# Patient Record
Sex: FEMALE | Race: WHITE | NOT HISPANIC OR LATINO | Employment: STUDENT | URBAN - METROPOLITAN AREA
[De-identification: names, ages, dates, MRNs, and addresses within clinical notes are randomized per-mention and may not be internally consistent; named-entity substitution may affect disease eponyms.]

---
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
## 2025-03-03 ENCOUNTER — OFFICE VISIT (OUTPATIENT)
Dept: URGENT CARE | Facility: CLINIC | Age: 20
End: 2025-03-03
Payer: COMMERCIAL

## 2025-03-03 VITALS
DIASTOLIC BLOOD PRESSURE: 77 MMHG | SYSTOLIC BLOOD PRESSURE: 112 MMHG | WEIGHT: 115 LBS | TEMPERATURE: 99 F | OXYGEN SATURATION: 98 % | HEART RATE: 87 BPM | BODY MASS INDEX: 18.05 KG/M2 | RESPIRATION RATE: 16 BRPM | HEIGHT: 67 IN

## 2025-03-03 DIAGNOSIS — J02.9 SORE THROAT: ICD-10-CM

## 2025-03-03 DIAGNOSIS — J02.9 ACUTE PHARYNGITIS, UNSPECIFIED ETIOLOGY: Primary | ICD-10-CM

## 2025-03-03 DIAGNOSIS — R09.82 POST-NASAL DRIP: ICD-10-CM

## 2025-03-03 LAB
CTP QC/QA: YES
MOLECULAR STREP A: NEGATIVE

## 2025-03-03 PROCEDURE — 99213 OFFICE O/P EST LOW 20 MIN: CPT | Mod: S$GLB,,, | Performed by: NURSE PRACTITIONER

## 2025-03-03 PROCEDURE — 87651 STREP A DNA AMP PROBE: CPT | Mod: QW,S$GLB,, | Performed by: NURSE PRACTITIONER

## 2025-03-03 NOTE — PATIENT INSTRUCTIONS
Tylenol or ibuprofen for pain may help as long as you are not allergic to these meds or have a medical condition such as stomach ulcers, liver or kidney disease or taking blood thinners etc that would prevent you from using these medications.     Rest and fluids will help as well.   Sore throats are commonly caused by viruses and does not require antibiotics.  Warm saltwater gargles, warm tea with honey, Chloraseptic spray, Cepacol throat lozenges as needed for sore throat.    If symptoms do not improve in 2-3 days please return for evaluation.

## 2025-03-03 NOTE — LETTER
March 3, 2025      [facility] [place]  [address] [address]  Phone: [phone]  Fax: [phone]       Patient: Anita Holly   YOB: 2005  Date of Visit: 03/03/2025    To Whom It May Concern:    Jenny Holly  was at [facility] on 03/03/2025. The patient may return to work/school on 3/4/2025 with no restrictions. If you have any questions or concerns, or if I can be of further assistance, please do not hesitate to contact me.    Sincerely,      Barbra Waetrs NP

## 2025-03-03 NOTE — PROGRESS NOTES
"Subjective:      Patient ID: Anita Holly is a 20 y.o. female.    Vitals:  height is 5' 7" (1.702 m) and weight is 52.2 kg (115 lb). Her oral temperature is 98.5 °F (36.9 °C). Her blood pressure is 112/77 and her pulse is 87. Her respiration is 16 and oxygen saturation is 98%.     Chief Complaint: Sore Throat    This pt complains of sore throat x 1 day. S/S: No nausea, vomiting, diarrhea, or fever. Had strep throat often when she was younger so wanted make sure it isn't strep.    Sore Throat   This is a new problem. The current episode started today. The problem has been unchanged. Sore throat worse side: both sides. The pain is at a severity of 5/10. The pain is mild. Pertinent negatives include no congestion, coughing, diarrhea, headaches or vomiting.       Constitution: Negative for fatigue and fever.   HENT:  Positive for sore throat. Negative for congestion.    Respiratory:  Negative for cough.    Gastrointestinal:  Negative for vomiting and diarrhea.   Neurological:  Negative for headaches.      Objective:     Physical Exam   Constitutional: She is oriented to person, place, and time. She appears well-developed. She is cooperative.  Non-toxic appearance. She does not appear ill. No distress.   HENT:   Head: Normocephalic and atraumatic.   Ears:   Right Ear: Hearing, tympanic membrane, external ear and ear canal normal.   Left Ear: Hearing, tympanic membrane, external ear and ear canal normal.   Nose: Nose normal. No mucosal edema, rhinorrhea, nasal deformity or congestion. No epistaxis. Right sinus exhibits no maxillary sinus tenderness and no frontal sinus tenderness. Left sinus exhibits no maxillary sinus tenderness and no frontal sinus tenderness.   Mouth/Throat: Uvula is midline and mucous membranes are normal. No trismus in the jaw. Normal dentition. No uvula swelling. Posterior oropharyngeal erythema (mild with some post nasal drip) present. No oropharyngeal exudate, posterior oropharyngeal edema, " tonsillar abscesses or cobblestoning.   Eyes: Conjunctivae and lids are normal. No scleral icterus.   Neck: Trachea normal and phonation normal. Neck supple. No edema present. No erythema present. No neck rigidity present.   Cardiovascular: Normal rate, regular rhythm, normal heart sounds and normal pulses.   Pulmonary/Chest: Effort normal and breath sounds normal. No stridor. No respiratory distress. She has no decreased breath sounds. She has no wheezes. She has no rales. She exhibits no tenderness.   Abdominal: Normal appearance.   Musculoskeletal: Normal range of motion.         General: No deformity. Normal range of motion.   Neurological: She is alert and oriented to person, place, and time. She exhibits normal muscle tone. Coordination normal.   Skin: Skin is warm, dry, intact, not diaphoretic and not pale.   Psychiatric: Her speech is normal and behavior is normal. Judgment and thought content normal.   Nursing note and vitals reviewed.      Assessment:     1. Acute pharyngitis, unspecified etiology    2. Sore throat    3. Post-nasal drip      Results for orders placed or performed in visit on 03/03/25   POCT Strep A, Molecular    Collection Time: 03/03/25 10:45 AM   Result Value Ref Range    Molecular Strep A, POC Negative Negative     Acceptable Yes        Plan:       Acute pharyngitis, unspecified etiology    Sore throat  -     POCT Strep A, Molecular    Post-nasal drip             Patient Instructions   Tylenol or ibuprofen for pain may help as long as you are not allergic to these meds or have a medical condition such as stomach ulcers, liver or kidney disease or taking blood thinners etc that would prevent you from using these medications.     Rest and fluids will help as well.   Sore throats are commonly caused by viruses and does not require antibiotics.  Warm saltwater gargles, warm tea with honey, Chloraseptic spray, Cepacol throat lozenges as needed for sore throat.    If symptoms  do not improve in 2-3 days please return for evaluation.

## 2025-05-29 ENCOUNTER — OFFICE VISIT (OUTPATIENT)
Dept: URGENT CARE | Facility: CLINIC | Age: 20
End: 2025-05-29
Payer: COMMERCIAL

## 2025-05-29 VITALS
SYSTOLIC BLOOD PRESSURE: 103 MMHG | TEMPERATURE: 99 F | BODY MASS INDEX: 18.83 KG/M2 | WEIGHT: 120 LBS | HEART RATE: 90 BPM | DIASTOLIC BLOOD PRESSURE: 64 MMHG | HEIGHT: 67 IN | OXYGEN SATURATION: 98 % | RESPIRATION RATE: 20 BRPM

## 2025-05-29 DIAGNOSIS — R35.0 URINARY FREQUENCY: Primary | ICD-10-CM

## 2025-05-29 LAB
BILIRUBIN, UA POC OHS: NEGATIVE
BLOOD, UA POC OHS: NEGATIVE
CLARITY, UA POC OHS: CLEAR
COLOR, UA POC OHS: YELLOW
GLUCOSE, UA POC OHS: NEGATIVE
KETONES, UA POC OHS: NEGATIVE
LEUKOCYTES, UA POC OHS: NEGATIVE
NITRITE, UA POC OHS: NEGATIVE
PH, UA POC OHS: 7
PROTEIN, UA POC OHS: NEGATIVE
SPECIFIC GRAVITY, UA POC OHS: 1.01
UROBILINOGEN, UA POC OHS: 0.2

## 2025-05-29 PROCEDURE — 81003 URINALYSIS AUTO W/O SCOPE: CPT | Mod: QW,S$GLB,, | Performed by: NURSE PRACTITIONER

## 2025-05-29 PROCEDURE — 99214 OFFICE O/P EST MOD 30 MIN: CPT | Mod: S$GLB,,, | Performed by: NURSE PRACTITIONER

## 2025-05-29 RX ORDER — NITROFURANTOIN 25; 75 MG/1; MG/1
100 CAPSULE ORAL 2 TIMES DAILY
Qty: 10 CAPSULE | Refills: 0 | Status: SHIPPED | OUTPATIENT
Start: 2025-05-29 | End: 2025-06-03

## 2025-05-29 NOTE — PROGRESS NOTES
"Subjective:      Patient ID: Anita Holly is a 20 y.o. female.    Vitals:  height is 5' 7" (1.702 m) and weight is 54.4 kg (120 lb). Her temperature is 98.5 °F (36.9 °C). Her blood pressure is 103/64 and her pulse is 90. Her respiration is 20 and oxygen saturation is 98%.     Chief Complaint: Dysuria    20 yr old female presents to the Urgent Care with complaint of urinary frequency and odor with urination x 2 weeks. Patient's home urinalysis test resulted positive for leukocytes. Patient denies any CP, SOB, abdominal pain, flank pain or fever.     Dysuria   This is a new problem. The current episode started 1 to 4 weeks ago. The problem occurs every urination. The problem has been gradually worsening. The pain is at a severity of 0/10. The patient is experiencing no pain. There has been no fever. She is Not sexually active. There is No history of pyelonephritis. Associated symptoms include frequency and urgency. Pertinent negatives include no behavior changes, chills, discharge, flank pain, hematuria, hesitancy, nausea, possible pregnancy, sweats, vomiting, weight loss, bubble bath use, constipation, rash or withholding. She has tried nothing for the symptoms. There is no history of recurrent UTIs or STD.       Constitution: Negative for chills.   Gastrointestinal:  Negative for nausea, vomiting and constipation.   Genitourinary:  Positive for frequency and urgency. Negative for dysuria, urine decreased, flank pain, bladder incontinence, bed wetting and hematuria.   Skin:  Negative for rash.      Objective:     Physical Exam   Constitutional: She is oriented to person, place, and time. She appears well-developed. She is cooperative. She is easily aroused.  Non-toxic appearance. She does not appear ill. No distress.   HENT:   Head: Normocephalic and atraumatic.   Nose: Nose abnormal.   Mouth/Throat: Mucous membranes are normal.   Eyes: Conjunctivae and lids are normal.   Cardiovascular: Normal rate, regular " rhythm and normal heart sounds.   Pulmonary/Chest: Effort normal and breath sounds normal. No respiratory distress.   Abdominal: Normal appearance and bowel sounds are normal. She exhibits no distension, no abdominal bruit, no pulsatile midline mass and no mass. Soft. There is no abdominal tenderness. There is no rigidity, no rebound, no guarding, no tenderness at McBurney's point, negative Neff's sign, no left CVA tenderness and no right CVA tenderness.   Musculoskeletal: Normal range of motion.         General: No edema. Normal range of motion.   Neurological: She is alert, oriented to person, place, and time and easily aroused. She has normal strength.   Skin: Skin is warm, dry, intact, not diaphoretic and not pale.   Psychiatric: Her speech is normal and behavior is normal. Judgment and thought content normal.   Nursing note and vitals reviewed.      Assessment:     1. Urinary frequency      Plan:   She has no abdominal tenderness, distention, peritoneal signs, or CVA tenderness.     Lab\Radiology\Other Procedure RESULTS: UA negative.     Given the above findings, my overall impression is UTI. Given the above findings, I do not think the patient has pyelonephritis or STI .     The patient will be discharged home with Macrobid rx. The diagnosis, treatment plan, instructions for follow-up and reevaluation with her PCP as well as ED precautions have been discussed with the patient and she has verbalized an understanding of the information. All questions or concerns from the patient have been addressed.     Urinary frequency  -     POCT Urinalysis(Instrument)  -     nitrofurantoin, macrocrystal-monohydrate, (MACROBID) 100 MG capsule; Take 1 capsule (100 mg total) by mouth 2 (two) times daily. for 5 days  Dispense: 10 capsule; Refill: 0

## 2025-05-29 NOTE — PATIENT INSTRUCTIONS
